# Patient Record
Sex: MALE | Race: WHITE | URBAN - METROPOLITAN AREA
[De-identification: names, ages, dates, MRNs, and addresses within clinical notes are randomized per-mention and may not be internally consistent; named-entity substitution may affect disease eponyms.]

---

## 2017-12-27 ENCOUNTER — TRANSFERRED RECORDS (OUTPATIENT)
Dept: HEALTH INFORMATION MANAGEMENT | Facility: CLINIC | Age: 57
End: 2017-12-27

## 2018-01-05 ENCOUNTER — TELEPHONE (OUTPATIENT)
Dept: OPHTHALMOLOGY | Facility: CLINIC | Age: 58
End: 2018-01-05

## 2018-01-05 NOTE — TELEPHONE ENCOUNTER
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 I will fax over what records I have received.    Thanks,  Avni    Referral / Discharge Coordinator

## 2018-01-05 NOTE — TELEPHONE ENCOUNTER
No insurance  Dr. price able to January 22nd.    Will need updated insurance before triage able to schedule    Called pt, but not available will try again Monday    Aguila Scott RN 3:23 PM 01/05/18

## 2018-01-12 NOTE — TELEPHONE ENCOUNTER
No call back by 1-12-17    Left message with direct number at 1-12-17 and will mail letter  Aguila Scott RN 9:33 AM 01/12/18

## 2018-04-16 DIAGNOSIS — H31.8 CHOROIDAL LESION: Primary | ICD-10-CM

## 2018-04-17 ENCOUNTER — OFFICE VISIT (OUTPATIENT)
Dept: OPHTHALMOLOGY | Facility: CLINIC | Age: 58
End: 2018-04-17
Attending: OPHTHALMOLOGY
Payer: COMMERCIAL

## 2018-04-17 DIAGNOSIS — D31.91: ICD-10-CM

## 2018-04-17 DIAGNOSIS — H47.321 DRUSEN OF RIGHT OPTIC DISC: Primary | ICD-10-CM

## 2018-04-17 PROCEDURE — G0463 HOSPITAL OUTPT CLINIC VISIT: HCPCS | Mod: ZF

## 2018-04-17 PROCEDURE — 92250 FUNDUS PHOTOGRAPHY W/I&R: CPT | Mod: ZF | Performed by: OPHTHALMOLOGY

## 2018-04-17 PROCEDURE — 92134 CPTRZ OPH DX IMG PST SGM RTA: CPT | Mod: ZF | Performed by: OPHTHALMOLOGY

## 2018-04-17 PROCEDURE — 76510 OPH US DX B-SCAN&QUAN A-SCAN: CPT | Mod: ZF,RT | Performed by: OPHTHALMOLOGY

## 2018-04-17 RX ORDER — FLUTICASONE PROPIONATE 50 MCG
2 SPRAY, SUSPENSION (ML) NASAL
COMMUNITY
Start: 2016-08-29

## 2018-04-17 RX ORDER — NAPROXEN SODIUM 220 MG
TABLET ORAL
COMMUNITY
Start: 2017-09-26

## 2018-04-17 RX ORDER — ALBUTEROL SULFATE 90 UG/1
1-2 AEROSOL, METERED RESPIRATORY (INHALATION)
COMMUNITY
Start: 2017-08-22

## 2018-04-17 RX ORDER — LISINOPRIL AND HYDROCHLOROTHIAZIDE 20; 25 MG/1; MG/1
TABLET ORAL
COMMUNITY
Start: 2018-01-27

## 2018-04-17 RX ORDER — INSULIN GLARGINE 100 [IU]/ML
48 INJECTION, SOLUTION SUBCUTANEOUS
COMMUNITY
Start: 2018-03-13

## 2018-04-17 RX ORDER — DIPHENHYDRAMINE HCL 25 MG
TABLET ORAL
COMMUNITY
Start: 2012-05-16

## 2018-04-17 RX ORDER — TIOTROPIUM BROMIDE 18 UG/1
18 CAPSULE ORAL; RESPIRATORY (INHALATION)
COMMUNITY
Start: 2017-03-16

## 2018-04-17 RX ORDER — ATORVASTATIN CALCIUM 40 MG/1
TABLET, FILM COATED ORAL
COMMUNITY
Start: 2017-09-28

## 2018-04-17 RX ORDER — CYCLOBENZAPRINE HCL 10 MG
10 TABLET ORAL
COMMUNITY
Start: 2018-03-29

## 2018-04-17 RX ORDER — HYDROCODONE BITARTRATE AND ACETAMINOPHEN 5; 325 MG/1; MG/1
TABLET ORAL
COMMUNITY
Start: 2018-03-29

## 2018-04-17 RX ORDER — PEN NEEDLE, DIABETIC 30 GX5/16"
NEEDLE, DISPOSABLE MISCELLANEOUS
COMMUNITY
Start: 2017-11-22

## 2018-04-17 ASSESSMENT — SLIT LAMP EXAM - LIDS
COMMENTS: NORMAL
COMMENTS: NORMAL

## 2018-04-17 ASSESSMENT — TONOMETRY
OD_IOP_MMHG: 18
IOP_METHOD: TONOPEN
OS_IOP_MMHG: 16

## 2018-04-17 ASSESSMENT — CUP TO DISC RATIO
OD_RATIO: 0.2
OS_RATIO: 0.25

## 2018-04-17 ASSESSMENT — REFRACTION_WEARINGRX
OD_SPHERE: -3.00
SPECS_TYPE: SVL
OS_AXIS: 180
OD_AXIS: 175
OS_CYLINDER: +0.25
OS_SPHERE: -2.75
OD_CYLINDER: +0.50

## 2018-04-17 ASSESSMENT — VISUAL ACUITY
METHOD: SNELLEN - LINEAR
OD_CC: 20/20
CORRECTION_TYPE: GLASSES
OS_CC: 20/20

## 2018-04-17 ASSESSMENT — EXTERNAL EXAM - RIGHT EYE: OD_EXAM: NORMAL

## 2018-04-17 ASSESSMENT — ENCOUNTER SYMPTOMS: JOINT SWELLING: 1

## 2018-04-17 ASSESSMENT — CONF VISUAL FIELD
OS_NORMAL: 1
OD_NORMAL: 1

## 2018-04-17 ASSESSMENT — EXTERNAL EXAM - LEFT EYE: OS_EXAM: NORMAL

## 2018-04-17 NOTE — NURSING NOTE
Chief Complaints and History of Present Illnesses   Patient presents with     Consult For     Lesion RE     HPI    Affected eye(s):  Right   Symptoms:        Duration:  1 year   Frequency:  Constant       Do you have eye pain now?:  No      Comments:  Pt. States that he was told that there was a freckle in the back on the RE about 15 years ago.  Has had it monitored every year and there had been no changes until the last year.  Has been changing shape and expanding over the last year.  No change in VA BE.  No c/o comfort BE.  No flashes BE.  Occasional floaters BE.  Atiya Mcneill COT 7:48 AM April 17, 2018

## 2018-04-17 NOTE — LETTER
4/17/2018       RE: Boris Iverson  [address]  [facility] [address]     Dear Colleague,    Thank you for referring your patient, Boris Iverson, to the EYE CLINIC at [facility]. Please see a copy of my visit note below.    CC -   Melanocytoma, right eye    INTERVAL HISTORY - Initial visit with me    HPI -   Boris Iverson is a  57 year old year-old patient referred by Dr Rolf Jarrell from [facility] for evaluation of a melanocytoma of the right eye. Patient reports the lesion was first noted ~15 years ago. Has followed with different providers over the years. Has fundus photos from 2007, 2010, and 2017 from [facility]. Saw Dr. Jarrell 1/4/18 who was concerned that lesion was enlarging over past 2-3 years and referred him. Denies change in vision, flashes, or new floaters.     Type 2 diabetes mellitus since 2001, on insulin and metformin, patient reports most recent A1c 7.1 (Fall 2017). No history of diabetic retinopathy.    PAST OCULAR SURGERY  None    RETINAL IMAGING:  OCT  4-17-18  OD -  macula normal, PHF attached    ONH with supericial hyper-reflective layer  OS -  Macula normal, PHF attached    PHOTOS 4-17-18  OD- melanocytoma      U/S OD 4-17-18  A-scan - lesion too thin to visualize  B-Scan - no elevated lesion,  likely single drusen on ONH      ASSESSMENT & PLAN    1. Melanocytoma, right eye   -1/2 disc diameter along superotemporal aspect of optic nerve   -compared to outside disc photos from 2007, 2010, and 2017   - appears to have enlarged between 2010 and 2017   - has been stable since 10/2017   - lesion still very small, amount of change small   - low suspicion at this point      - observe closely for now   - recheck 3 months as precaution       2. Type 2 diabetes mellitus   -No retinopathy on exam today   -BP/BG control and annual dilated exam    3. Syneresis OU       4.  ONH drusen OD   - seen on U/S OD 4/2018   - repeat U/S in  future    4. Cataracts, both eyes    -Mild, monitor    5. Myopia with astigmatism and presbyopia   -Good VA with current glasses        return to clinic: 3 months, OCT, photos OD, U/S OD      ATTESTATION     Attending Attestation:     Complete documentation of historical and exam elements from today's encounter can be found in the full encounter summary report (not reduplicated in this progress note).  I personally obtained the chief complaint(s) and history of present illness.  I confirmed and edited as necessary the review of systems, past medical/surgical history, family history, social history, and examination findings as documented by others; and I examined the patient myself.  I personally reviewed the relevant tests, images, and reports as documented above.  I personally reviewed the ophthalmic test(s) associated with this encounter, agree with the interpretation(s) as documented by the resident/fellow, and have edited the corresponding report(s) as necessary.   I formulated and edited as necessary the assessment and plan and discussed the findings and management plan with the patient and family    Barbara Costello MD, PhD  , Vitreoretinal Surgery  Department of Ophthal[facility]  [facility]              Base Eye Exam     Visual Acuity (Snellen - Linear)      Right Left   Dist cc 20/20 20/20       Correction:  Glasses      Tonometry (Tonopen, 7:53 AM)      Right Left   Pressure 18 16         Pupils      Pupils React APD   Right PERRL Brisk None   Left PERRL Brisk None         Visual Fields      Left Right   Result Full Full         Extraocular Movement      Right Left   Result Full Full         Neuro/Psych     Oriented x3:  Yes    Mood/Affect:  Normal      Dilation     Both eyes:  1.0% Mydriacyl, 2.5% Monty Synephrine @ 7:55 AM            Slit Lamp and Fundus Exam     External Exam      Right Left    External Normal Normal      Slit Lamp Exam      Right Left    Lids/Lashes Normal  Normal    Conjunctiva/Sclera White and quiet White and quiet    Cornea Clear Clear    Anterior Chamber Deep and quiet Deep and quiet    Iris Dilated Dilated    Lens 1+ NS 1+ NS    Vitreous Syneresis Syneresis      Fundus Exam      Right Left    Disc 1/2 DD pigmented lesion over ST disk, deeper choroidal component extending 1/DD from disk Normal    C/D Ratio 0.2 0.25    Macula Normal Normal    Vessels Normal Normal    Periphery Normal Normal            Refraction     Wearing Rx      Sphere Cylinder Axis   Right -3.00 +0.50 175   Left -2.75 +0.25 180       Age:  3yr    Type:  SVL                Again, thank you for allowing me to participate in the care of your patient.      Sincerely,    Barbara Costello MD

## 2018-04-17 NOTE — MR AVS SNAPSHOT
After Visit Summary   2018    Boris Iverson    MRN: [id number]           Patient Information     Date Of Birth          1960        Visit Information        Provider Department      2018 7:30 AM Barbara Costello MD Eye Clinic        Today's Diagnoses     Drusen of right optic disc    -  1    Melanocytoma of eye, right           Follow-ups after your visit        Follow-up notes from your care team     Return in about 3 months (around 2018) for OCT OU, Fundus photos OD (Optos), US OD.      Future tests that were ordered for you today     Open Future Orders        Priority Expected Expires Ordered    OCT Retina Spectralis OU (both eyes) Routine  10/19/2019 2018    Ultrasound B-scan OD (right eye) Routine  10/19/2019 2018    Fundus Photos OD (right eye) Routine  10/19/2019 2018    OCT Retina Spectralis OU (both eyes) Routine  10/19/2019 2018            Who to contact     Please call your clinic at [phone] to:    Ask questions about your health    Make or cancel appointments    Discuss your medicines    Learn about your test results    Speak to your doctor            Additional Information About Your Visit        MyChart Information     [email or web address] is an electronic gateway that provides easy, online access to your medical records. With [employer], you can request a clinic appointment, read your test results, renew a prescription or communicate with your care team.     To sign up for [employer] visit the website at [email or web address]   You will be asked to enter the access code listed below, as well as some personal information. Please follow the directions to create your username and password.     Your access code is: [email or web address]  Expires: 2018  6:31 AM     Your access code will  in 90 days. If you need help or a new code, please contact your [facility] or call [phone] for assistance.        Care EveryWhere  ID     This is your Care EveryWhere ID. This could be used by other organizations to access your [facility] medical records  [id number]         Blood Pressure from Last 3 Encounters:   No data found for BP    Weight from Last 3 Encounters:   No data found for Wt              We Performed the Following     Fundus Photos OU (both eyes)     OCT Retina Spectralis OU (both eyes)     US B-scan and A-scan OD (right eye)        Primary Care Provider Office Phone # Fax #    [facility] [phone] [address]  [facility] [address]        Equal Access to Services     ALIZE KENT : Hadii aad ku hadasho Soomaali, waaxda luqadaha, qaybta kaalmada adeegyada, michael brennan hayaida conklin . So [facility] [phone].    ATENCIÓN: Si habla español, tiene a anderson disposición servicios gratuitos de asistencia lingüística. [facility] [phone].    We comply with applicable federal civil rights laws and [place] laws. We do not discriminate on the basis of race, color, national origin, age, disability, sex, sexual orientation, or gender identity.            Thank you!     Thank you for choosing EYE CLINIC  for your care. Our goal is always to provide you with excellent care. Hearing back from our patients is one way we can continue to improve our services. Please take a few minutes to complete the written survey that you may receive in the mail after your visit with us. Thank you!             Your Updated Medication List - Protect others around you: Learn how to safely use, store and throw away your medicines at www.disposemymeds.org.          This list is accurate as of 4/17/18  1:24 PM.  Always use your most recent med list.                   Brand Name Dispense Instructions for use Diagnosis    ADVAIR DISKUS 250-50 MCG/DOSE diskus inhaler   Generic drug:  fluticasone-salmeterol      INHALE 1 PUFF EVERY 12 HOURS. RINSE MOUTH/GARGLE AFTER USE        albuterol 108 (90 Base)  "MCG/ACT Inhaler    PROAIR HFA/PROVENTIL HFA/VENTOLIN HFA     Inhale 1-2 puffs into the lungs        aspirin 81 MG EC tablet      Take 81 mg by mouth        atorvastatin 40 MG tablet    LIPITOR     TAKE 1 TABLET BY MOUTH DAILY (EVERY 24 HOURS).        BASAGLAR 100 UNIT/ML injection      Inject 48 Units Subcutaneous        cyclobenzaprine 10 MG tablet    FLEXERIL     Take 10 mg by mouth        fluticasone 50 MCG/ACT spray    FLONASE     Spray 2 sprays in nostril        HYDROcodone-acetaminophen 5-325 MG per tablet    NORCO          insulin aspart 100 UNIT/ML injection    NovoLOG PEN     INJECT SUBCUTANEOUSLY: 4 UNITS/CARB FOR MEALS, 3X/DAY: AVERAGE 70-80 UNITS/DAY.        insulin syringe 31G X 5/16\" 0.5 ML Misc           lidocaine 2 % topical gel    XYLOCAINE     Can use 2-3x daily on back        lisinopril-hydrochlorothiazide 20-25 MG per tablet    PRINZIDE/ZESTORETIC     TAKE 2 TABLETS BY MOUTH DAILY (EVERY 24 HOURS).        metFORMIN 1000 MG tablet    GLUCOPHAGE     TAKE 1 TABLET BY MOUTH 2 TIMES DAILY (WITH MEALS).        ONETOUCH VERIO IQ test strip   Generic drug:  blood glucose monitoring      Use  to test three times a day. Use as directed. Pharmacy dispense brand based on insurance.        Pen Needles 3/16\" 31G X 5 MM Misc           tiotropium 18 MCG capsule    SPIRIVA     Inhale 18 mcg into the lungs        TRUE METRIX AIR GLUCOSE METER w/Device Kit      by Misc.(Non-Drug; Combo Route) route. Test tid          "

## 2018-04-17 NOTE — PROGRESS NOTES
CC -   Melanocytoma, right eye    INTERVAL HISTORY - Initial visit with me    HPI -   Boirs Iverson is a  57 year old year-old patient referred by Dr Rolf Jarrell from [facility] for evaluation of a melanocytoma of the right eye. Patient reports the lesion was first noted ~15 years ago. Has followed with different providers over the years. Has fundus photos from 2007, 2010, and 2017 from [facility]. Saw Dr. Jarrell 1/4/18 who was concerned that lesion was enlarging over past 2-3 years and referred him. Denies change in vision, flashes, or new floaters.     Type 2 diabetes mellitus since 2001, on insulin and metformin, patient reports most recent A1c 7.1 (Fall 2017). No history of diabetic retinopathy.    PAST OCULAR SURGERY  None    RETINAL IMAGING:  OCT  4-17-18  OD -  macula normal, PHF attached    ONH with supericial hyper-reflective layer  OS -  Macula normal, PHF attached    PHOTOS 4-17-18  OD- melanocytoma      U/S OD 4-17-18  A-scan - lesion too thin to visualize  B-Scan - no elevated lesion,  likely single drusen on ONH      ASSESSMENT & PLAN    1. Melanocytoma, right eye   -1/2 disc diameter along superotemporal aspect of optic nerve   -compared to outside disc photos from 2007, 2010, and 2017   - appears to have enlarged between 2010 and 2017   - has been stable since 10/2017   - lesion still very small, amount of change small   - low suspicion at this point      - observe closely for now   - recheck 3 months as precaution       2. Type 2 diabetes mellitus   -No retinopathy on exam today   -BP/BG control and annual dilated exam    3. Syneresis OU       4.  ONH drusen OD   - seen on U/S OD 4/2018   - repeat U/S in future    4. Cataracts, both eyes    -Mild, monitor    5. Myopia with astigmatism and presbyopia   -Good VA with current glasses        return to clinic: 3 months, OCT, photos OD, U/S OD      ATTESTATION     Attending Attestation:     Complete documentation of historical  and exam elements from today's encounter can be found in the full encounter summary report (not reduplicated in this progress note).  I personally obtained the chief complaint(s) and history of present illness.  I confirmed and edited as necessary the review of systems, past medical/surgical history, family history, social history, and examination findings as documented by others; and I examined the patient myself.  I personally reviewed the relevant tests, images, and reports as documented above.  I personally reviewed the ophthalmic test(s) associated with this encounter, agree with the interpretation(s) as documented by the resident/fellow, and have edited the corresponding report(s) as necessary.   I formulated and edited as necessary the assessment and plan and discussed the findings and management plan with the patient and family    Barbara Costello MD, PhD  , Vitreoretinal Surgery  Department of Ophthalmology  [facility]